# Patient Record
Sex: MALE | Race: WHITE
[De-identification: names, ages, dates, MRNs, and addresses within clinical notes are randomized per-mention and may not be internally consistent; named-entity substitution may affect disease eponyms.]

---

## 2017-02-23 ENCOUNTER — HOSPITAL ENCOUNTER (OUTPATIENT)
Dept: HOSPITAL 75 - ER | Age: 47
Setting detail: OBSERVATION
LOS: 1 days | Discharge: HOME | End: 2017-02-24
Attending: INTERNAL MEDICINE | Admitting: INTERNAL MEDICINE
Payer: COMMERCIAL

## 2017-02-23 VITALS — SYSTOLIC BLOOD PRESSURE: 102 MMHG | DIASTOLIC BLOOD PRESSURE: 74 MMHG

## 2017-02-23 VITALS — SYSTOLIC BLOOD PRESSURE: 100 MMHG | DIASTOLIC BLOOD PRESSURE: 72 MMHG

## 2017-02-23 VITALS — DIASTOLIC BLOOD PRESSURE: 90 MMHG | SYSTOLIC BLOOD PRESSURE: 132 MMHG

## 2017-02-23 VITALS — DIASTOLIC BLOOD PRESSURE: 89 MMHG | SYSTOLIC BLOOD PRESSURE: 120 MMHG

## 2017-02-23 VITALS — HEIGHT: 74 IN | BODY MASS INDEX: 27.98 KG/M2 | WEIGHT: 218.06 LBS

## 2017-02-23 VITALS — DIASTOLIC BLOOD PRESSURE: 74 MMHG | SYSTOLIC BLOOD PRESSURE: 97 MMHG

## 2017-02-23 VITALS — DIASTOLIC BLOOD PRESSURE: 69 MMHG | SYSTOLIC BLOOD PRESSURE: 108 MMHG

## 2017-02-23 VITALS — DIASTOLIC BLOOD PRESSURE: 81 MMHG | SYSTOLIC BLOOD PRESSURE: 114 MMHG

## 2017-02-23 VITALS — DIASTOLIC BLOOD PRESSURE: 88 MMHG | SYSTOLIC BLOOD PRESSURE: 107 MMHG

## 2017-02-23 VITALS — DIASTOLIC BLOOD PRESSURE: 73 MMHG | SYSTOLIC BLOOD PRESSURE: 121 MMHG

## 2017-02-23 VITALS — DIASTOLIC BLOOD PRESSURE: 80 MMHG | SYSTOLIC BLOOD PRESSURE: 120 MMHG

## 2017-02-23 DIAGNOSIS — Z72.89: ICD-10-CM

## 2017-02-23 DIAGNOSIS — Z72.0: ICD-10-CM

## 2017-02-23 DIAGNOSIS — F41.9: ICD-10-CM

## 2017-02-23 DIAGNOSIS — I10: ICD-10-CM

## 2017-02-23 DIAGNOSIS — I48.91: Primary | ICD-10-CM

## 2017-02-23 LAB
ALBUMIN SERPL-MCNC: 4.5 G/DL (ref 3.2–4.5)
ALT SERPL-CCNC: 42 U/L (ref 0–55)
ANION GAP SERPL CALC-SCNC: 12 MMOL/L (ref 5–14)
APTT BLD: 29 SEC (ref 24–35)
AST SERPL-CCNC: 34 U/L (ref 5–34)
BASOPHILS # BLD AUTO: 0 10^3/UL (ref 0–0.1)
BASOPHILS NFR BLD AUTO: 0 % (ref 0–10)
BILIRUB SERPL-MCNC: 0.9 MG/DL (ref 0.1–1)
BUN SERPL-MCNC: 15 MG/DL (ref 7–18)
BUN/CREAT SERPL: 12
CALCIUM SERPL-MCNC: 9.1 MG/DL (ref 8.5–10.1)
CHLORIDE SERPL-SCNC: 104 MMOL/L (ref 98–107)
CO2 SERPL-SCNC: 24 MMOL/L (ref 21–32)
CREAT SERPL-MCNC: 1.26 MG/DL (ref 0.6–1.3)
EOSINOPHIL # BLD AUTO: 0.1 10^3/UL (ref 0–0.3)
EOSINOPHIL NFR BLD AUTO: 1 % (ref 0–10)
ERYTHROCYTE [DISTWIDTH] IN BLOOD BY AUTOMATED COUNT: 12.9 % (ref 10–14.5)
GFR SERPLBLD BASED ON 1.73 SQ M-ARVRAT: > 60 ML/MIN
GLUCOSE SERPL-MCNC: 101 MG/DL (ref 70–105)
INR PPP: 1.1 (ref 0.8–1.4)
LYMPHOCYTES # BLD AUTO: 1.9 X 10^3 (ref 1–4)
LYMPHOCYTES NFR BLD AUTO: 25 % (ref 12–44)
MAGNESIUM SERPL-MCNC: 2.3 MG/DL (ref 1.8–2.4)
MCH RBC QN AUTO: 31 PG (ref 25–34)
MCHC RBC AUTO-ENTMCNC: 34 G/DL (ref 32–36)
MCV RBC AUTO: 90 FL (ref 80–99)
MONOCYTES # BLD AUTO: 0.7 X 10^3 (ref 0–1)
MONOCYTES NFR BLD AUTO: 9 % (ref 0–12)
MYOGLOBIN SERPL-MCNC: 51.1 NG/ML (ref 10–92)
NEUTROPHILS # BLD AUTO: 5 X 10^3 (ref 1.8–7.8)
NEUTROPHILS NFR BLD AUTO: 65 % (ref 42–75)
PLATELET # BLD: 158 10^3/UL (ref 130–400)
PMV BLD AUTO: 11.6 FL (ref 7.4–10.4)
POTASSIUM SERPL-SCNC: 4.5 MMOL/L (ref 3.6–5)
PROT SERPL-MCNC: 7.6 G/DL (ref 6.4–8.2)
PROTHROMBIN TIME: 13.7 SEC (ref 12.2–14.7)
RBC # BLD AUTO: 4.91 10^6/UL (ref 4.35–5.85)
SODIUM SERPL-SCNC: 140 MMOL/L (ref 135–145)
WBC # BLD AUTO: 7.7 10^3/UL (ref 4.3–11)

## 2017-02-23 PROCEDURE — 80061 LIPID PANEL: CPT

## 2017-02-23 PROCEDURE — 36415 COLL VENOUS BLD VENIPUNCTURE: CPT

## 2017-02-23 PROCEDURE — 93041 RHYTHM ECG TRACING: CPT

## 2017-02-23 PROCEDURE — 80053 COMPREHEN METABOLIC PANEL: CPT

## 2017-02-23 PROCEDURE — 84443 ASSAY THYROID STIM HORMONE: CPT

## 2017-02-23 PROCEDURE — 84484 ASSAY OF TROPONIN QUANT: CPT

## 2017-02-23 PROCEDURE — 83735 ASSAY OF MAGNESIUM: CPT

## 2017-02-23 PROCEDURE — 96366 THER/PROPH/DIAG IV INF ADDON: CPT

## 2017-02-23 PROCEDURE — 71010: CPT

## 2017-02-23 PROCEDURE — 93005 ELECTROCARDIOGRAM TRACING: CPT

## 2017-02-23 PROCEDURE — 85730 THROMBOPLASTIN TIME PARTIAL: CPT

## 2017-02-23 PROCEDURE — 99211 OFF/OP EST MAY X REQ PHY/QHP: CPT

## 2017-02-23 PROCEDURE — 85025 COMPLETE CBC W/AUTO DIFF WBC: CPT

## 2017-02-23 PROCEDURE — 85610 PROTHROMBIN TIME: CPT

## 2017-02-23 PROCEDURE — 83874 ASSAY OF MYOGLOBIN: CPT

## 2017-02-23 PROCEDURE — 93306 TTE W/DOPPLER COMPLETE: CPT

## 2017-02-23 PROCEDURE — 96365 THER/PROPH/DIAG IV INF INIT: CPT

## 2017-02-23 RX ADMIN — Medication SCH ML: at 14:00

## 2017-02-23 RX ADMIN — DILTIAZEM HYDROCHLORIDE SCH MLS/HR: 100 INJECTION, POWDER, LYOPHILIZED, FOR SOLUTION INTRAVENOUS at 20:55

## 2017-02-23 RX ADMIN — DILTIAZEM HYDROCHLORIDE SCH MLS/HR: 100 INJECTION, POWDER, LYOPHILIZED, FOR SOLUTION INTRAVENOUS at 14:29

## 2017-02-23 RX ADMIN — APIXABAN SCH MG: 5 TABLET, FILM COATED ORAL at 20:54

## 2017-02-23 RX ADMIN — Medication SCH ML: at 22:00

## 2017-02-23 NOTE — CARDIOLOGY HISTORY & PHYSICAL
HPI-Cardiology


Cardiology H&P


Date of Admission


17


Primary Care  Physician


Yue,Local Physician


Attending Physician


Karin Medina MD FACP [facility]


Consulting Physician








TEE


Mr. Fuentes is a 46 year old male who is being admitted to ICU from the ED.  He 

reports he had been at work all night and has only slept approx 2 hours in the 

last 24 hours.  He states he was getting out of his truck at home and converted 

into a-fib with associated dizziness and shortness of breath.  No c/o CP, 

syncope or near syncope.  He is currently feeling better.  He states he has 

been taking his Torpol XL 100mg daily.





Review of Systems-Cardiology


Review of Systems


Constitutional:   As described under HPI


Eyes:  No blurred vision, No drainage, No pain, No vision change


Ears/Nose/Throat:  No ear discharge, No ear pain, No nasal drainage, No 

ulcerations


Respiratory:   As described under HPI


Cardiovascular:   As described under HPI


Gastrointestinal:  No constipation, No diarrhea, No nausea, No vomiting, No 

stool coloration changes


Genitourinary:  No dysuria, No discharge, No frequency, No hematuria, No urgency


Skin:  No rash, No skin related problems, No ulcerations


Psychiatric/Neurological:  No anxiety, No depression, No focal weakness, No 

seizure, No syncope


Hematologic:  No bleeding abnormalities





PMH-Social-Family Hx


Patient Social History


Alcohol Use:  Occasionally Uses


Recreational Drug Use:  No


Smoking Status:  Former Smoker


Former smoker/When Quit:  May 14, 2008


Recent Foreign Travel:  No


Recent Infectious Disease Expo:  No





Immunizations Up To Date


Tetanus Booster (TDap):  Unknown





Past Medical History


PMH


As described under Assessment.





Family Medical History


Family Medical History:  


He reports a brother with hypertension and palpitations.  His sister has


hypertension.  No family h/o of premature CAD.


Family History:  


19 MOTHER


  Colon cancer


G8 BROTHER


  Hypertension


G8 SISTER


  Hypertension


  Kidney disease





Allergies and Home Medications


Allergies


Coded Allergies:  


     No Known Drug Allergies (Unverified , 14)





Home Medications


Metoprolol Succinate 100 Mg Tab.er.24h 100 MG PO DAILY (Reported) 





Physical Exam-Cardiology


Physical Exam


Vital Signs/I&O





 Vital Sign - Last 12Hours








 17





 08:59 09:12 09:21 12:25


 


Temp 98.1  98.0 


 


Pulse 144  124 89


 


Resp 16  16 16


 


B/P 192/125  148/110 


 


Pulse Ox 100  99 98


 


O2 Delivery Room Air Nasal Cannula  


 


O2 Flow Rate  2 2.00 2


 


    





 17





 12:30 13:00 13:00 14:00


 


Temp 96.8   


 


Pulse 113 67 67 68


 


Resp 18 13  15


 


B/P 107/88 120/89  114/81


 


Pulse Ox 96   


 


O2 Delivery Room Air Room Air  Room Air


 


    





 17





 14:29 15:00 16:00 16:00


 


Temp    97.9


 


Pulse 100 101 67 67


 


Resp  17 13 13


 


B/P 113/81 97/74 102/74 102/74


 


Pulse Ox  98 96 96


 


O2 Delivery  Room Air Room Air Room Air





Capillary Refill : Less Than 3 Seconds


Constitutional:   appears stated ageNo apparent distress,  well-developed well-

nourished


HEENT:   PERRLNo discharge,  hearing is well preserved oral hygience is goodNo 

ulceration, No xanthelasmas are seen


Neck:  No carotid bruit,  carotid pulses are 2 + bilaterally


Cardiovascular:   irregularly irregularNo JVD,  S1 and S2


Gastrointestinal:  No tender,  soft roundNo spleenomegaly


Rectal:   deferred


Extremities:  No clubbing, No cyanosis, No significant edema


Neurologic/Psychiatric:   alert oriented x 3 power is 5/5 both on sides


Skin:  No rash, No ulcerations





Data Review


Labs


 Laboratory Tests


17 09:05: 


Activated Partial Thromboplast Time 29, Alanine Aminotransferase (ALT/SGPT) 42, 

Albumin 4.5, Alkaline Phosphatase 58, Anion Gap 12, Aspartate Amino Transf (AST/

SGOT) 34, BUN/Creatinine Ratio 12, Basophils # (Auto) 0.0, Basophils (%) (Auto) 

0, Blood Urea Nitrogen 15, Calcium Level 9.1, Carbon Dioxide Level 24, Chloride 

Level 104, Creatinine 1.26, Eosinophils # (Auto) 0.1, Eosinophils (%) (Auto) 1, 

Estimat Glomerular Filtration Rate > 60, Glucose Level 101, Hematocrit 44, 

Hemoglobin 15.0, INR Comment 1.1, Lymphocytes # (Auto) 1.9, Lymphocytes (%) (

Auto) 25, Magnesium Level 2.3, Mean Corpuscular Hemoglobin 31, Mean Corpuscular 

Hemoglobin Concent 34, Mean Corpuscular Volume 90, Mean Platelet Volume 11.6H, 

Monocytes # (Auto) 0.7, Monocytes (%) (Auto) 9, Myoglobin 51.1, Neutrophils # (

Auto) 5.0, Neutrophils (%) (Auto) 65, Platelet Count 158, Potassium Level 4.5, 

Prothrombin Time 13.7, Red Blood Count 4.91, Red Cell Distribution Width 12.9, 

Sodium Level 140, TSH [facility] Testing 2.15, Total Bilirubin 0.9, Total Protein 

7.6, Troponin I < 0.30, White Blood Count 7.7








Radiology


NAME:      PACO FUENTES


[facility] REC#:   [id number]


ACCOUNT#:   [id number]


PT STATUS:   REG ER


:      1970


PHYSICIAN:    KYUNG CAI MD


ADMIT DATE:   17/ER


***Signed***


Date of Exam:   17





CHEST 1 VIEW, AP/PA ONLY


 


INDICATION: Atrial fibrillation





PA chest obtained at 9:29 a.m.





Heart and mediastinal silhouette are normal in appearance. The


lungs are clear. There is no pneumothorax or pleural fluid.





IMPRESSION:





Negative chest and no change from 16.





Dictated by:





Dictated on workstation # [id number]





Dict:   [id number]


Trans:   17 [id number]


[facility]  [id number]





Interpreted by:       KYLEIGH PETER MD


Electronically signed by:KYLEIGH PETER MD   17 [id number]





ECG Impression


ECG


Initial ECG Impression:  Atrial Fibrillation w/RVR





A/P-Cardiology


Assessment/Admission Diagnosis


Atrial fib with RVR





Hypertension





PAF - electrical cardioversion on 13, 2014, and 6-4-15. RFA by Dr Delatorre in 2015. Oral anticoag stopped by Dr Delatorre in Dec 2016





EFRAÍN 14 and of 6-4-15 showed LVEF 60%.  No significant valvular 

regurgitation or stenoses see. No evidence of intracardiac thrombus or 

intracardiac shunt





CT Abd/Pelvis 14 showed calcification in the left renal pelvis near the 

ureteropelvic junction without significant obstruction - following with his PCP





Intermittent heavy alcohol use - cessation advised





Chronic tobacco use (chews) - cessation advised





H/o anxiety





Discussion and Recomendations


H/O PAF for which he has seen Dr. Delatorre in the past and has undergone several 

cardioversions and most recently ablation in 2015.  He is currently in a-fib 

with RVR.  He is being maintained on a Cardizem gtt.  He was previously not on 

OAC.  We will restart him on OAC with Eliquis for stroke prophylaxis.  We have 

discussed this with him.  We will do an echocardiogram to evaluate LVEF and 

valvular status.  Further recommendations will be based on his hospital course.





This H&P is being scribed by BRAULIO Singh on behalf of Dr. Medina after 

discussion regarding plan of care





Physician Assessment


Physician Assessment


Lungs: good bilat air entry


Cor: irreg





A&R


* As documented in our note above


* I spoke with him and answered questions


* Will be in ICU while on iv diltiazem








GUILLERMO SYED 2017 11:18


KARIN MEDINA MD FACP [facility]AC CCDS 2017 17:12

## 2017-02-23 NOTE — ED CARDIAC GENERAL
History of Present Illness


General


Chief Complaint:  Cardiac/General Problems


Stated Complaint:  IRR HEART RATE


Nursing Triage Note:  


AMBUALTED TO ROOM 05 WITH COMPLAINTS OF BEING IN AFIB FOR ABOUT 20 MINS PTA.


Source:  patient


Exam Limitations:  no limitations





History of Present Illness


Time seen by provider:  09:00


Initial Comments


This 46 year old gentleman presents to the emergency room with complaints of 

irregular heart rhythm.  He believes he is in atrial fibrillation.  He has a 

history of atrial fibrillation and had an ablation procedure performed about a 

year and a half ago at [facility].  He was taken off of anticoagulation shortly after 

that ablation.  He has had no episodes of A. fib to his knowledge in the 

interim.  Atrial fibrillation is confirmed on the monitor and EKG.





Allergies and Home Medications


Allergies


Coded Allergies:  


     No Known Drug Allergies (Unverified , 14)





Home Medications


Metoprolol Succinate 100 Mg Tab.er.24h #30 (Reported) 





Review of Systems


Constitutional:   no symptoms reported


EENTM:   No Symptoms Reported


Respiratory:   No Symptoms Reported


Cardiovascular:   See HPI


Gastrointestinal:   No Symptoms Reported


Genitourinary:   No Symptoms Reported


Musculoskeletal:   no symptoms reported


Skin:   no symptoms reported


Endocrine:   No Symptoms Reported





Past Medical-Social-Family Hx


Patient Social History


Alcohol Use:  Occasionally Uses


Recreational Drug Use:  No


Smoking Status:  Former Smoker


Former Smoker/When Quit:  May 14, 2008


Recent Foreign Travel:  No


Contact w/Someone Who Travel:  No


Recent Infectious Disease Expo:  No


Recent Hopitalizations:  No





Immunizations Up To Date


Tetanus Booster (TDap):  Unknown





Surgeries


HX Surgeries:  Yes (ATRIAL FIBRILLATION--S/P CARDIAC ABLATION)


Surgeries:  Appendectomy, Cardiac (ablation for treatment of atrial fibrillation

)





Respiratory


Hx Respiratory Disorders:  No





Cardiovascular


Hx Cardiac Disorders:  Yes (S/P ABLATION)


Cardiac Disorders:  Atrial Fibrillation, Hypertension





Neurological


Hx Neurological Disorders:  Yes


Neurological Disorders:  Concussion





Reproductive System


Hx Reproductive Disorders:  No





Genitourinary


Hx Genitourinary Disorders:  No





Gastrointestinal


Hx Gastrointestinal Disorders:  No





Musculoskeletal


Hx Musculoskeletal Disorders:  No





Endocrine


Hx Endocrine Disorders:  No





HEENT


HX ENT Disorders:  No





Cancer


Hx Cancer:  No





Psychosocial


Hx Psychiatric Problems:  No





Integumentary


HX Skin/Integumentary Disorder:  No





Blood Transfusions


Hx Blood Disorders:  No


Adverse Reaction to a Blood Tr:  No





Family Medical History


Family Medial History:  


Colon cancer


  19 MOTHER


Hypertension


  G8 BROTHER


  G8 SISTER


Kidney disease


  G8 SISTER





Physical Exam


Vital Signs





 Vital Sign - Last 12Hours








 17





 08:59 09:12


 


Temp 98.1 


 


Pulse 144 


 


Resp 16 


 


B/P 192/125 


 


Pulse Ox 100 


 


O2 Delivery Room Air 


 


O2 Flow Rate  2





Capillary Refill : Less Than 3 Seconds


General Appearance:   WD/WN Mild Distress


HEENT:   PERRL/EOMI Normal ENT Inspection


Neck:   Normal Inspection


Respiratory:   Lungs Clear Normal Breath Sounds No Accessory Muscle Use No 

Respiratory Distress


Cardiovascular:   No Edema No Murmur Irregularly Irregular Tachycardia


Gastrointestinal:   Non Tender Soft


Extremity:   Normal Inspection No Pedal Edema


Neurologic/Psychiatric:   Alert Oriented x3 No Motor/Sensory Deficits Normal 

Mood/Affect CNs II-XII Norm as Tested


Skin:   Normal Color Warm/Dry





Progress/Results/Core Measures


Results/Orders


Lab Results





 Laboratory Tests








Test


  17


09:05 Range/Units


 


 


Activated Partial


Thromboplast Time 29 


  24-35  SEC


 


 


Alanine Aminotransferase


(ALT/SGPT) 42 


  0-55  U/L


 


 


Albumin 4.5  3.2-4.5  G/DL


 


Alkaline Phosphatase 58    U/L


 


Anion Gap 12  5-14  MMOL/L


 


Aspartate Amino Transf


(AST/SGOT) 34 


  5-34  U/L


 


 


BUN/Creatinine Ratio 12   


 


Basophils # (Auto)


  0.0 


  0.0-0.1


10^3/uL


 


Basophils (%) (Auto) 0  0-10  %


 


Blood Urea Nitrogen 15  7-18  MG/DL


 


Calcium Level 9.1  8.5-10.1  MG/DL


 


Carbon Dioxide Level 24  21-32  MMOL/L


 


Chloride Level 104    MMOL/L


 


Creatinine


  1.26 


  0.60-1.30


MG/DL


 


Eosinophils # (Auto)


  0.1 


  0.0-0.3


10^3/uL


 


Eosinophils (%) (Auto) 1  0-10  %


 


Estimat Glomerular Filtration


Rate > 60 


   


 


 


Glucose Level 101    MG/DL


 


Hematocrit 44  40-54  %


 


Hemoglobin 15.0  13.3-17.7  G/DL


 


INR Comment 1.1  0.8-1.4  


 


Lymphocytes # (Auto) 1.9  1.0-4.0  X 10^3


 


Lymphocytes (%) (Auto) 25  12-44  %


 


Magnesium Level 2.3  1.8-2.4  MG/DL


 


Mean Corpuscular Hemoglobin 31  25-34  PG


 


Mean Corpuscular Hemoglobin


Concent 34 


  32-36  G/DL


 


 


Mean Corpuscular Volume 90  80-99  FL


 


Mean Platelet Volume 11.6 H 7.4-10.4  FL


 


Monocytes # (Auto) 0.7  0.0-1.0  X 10^3


 


Monocytes (%) (Auto) 9  0-12  %


 


Myoglobin


  51.1 


  10.0-92.0


NG/ML


 


Neutrophils # (Auto) 5.0  1.8-7.8  X 10^3


 


Neutrophils (%) (Auto) 65  42-75  %


 


Platelet Count


  158 


  130-400


10^3/uL


 


Potassium Level 4.5  3.6-5.0  MMOL/L


 


Prothrombin Time 13.7  12.2-14.7  SEC


 


Red Blood Count


  4.91 


  4.35-5.85


10^6/uL


 


Red Cell Distribution Width 12.9  10.0-14.5  %


 


Sodium Level 140  135-145  MMOL/L


 


TSH [facility] Testing


  2.15 


  0.35-4.94


UIU/ML


 


Total Bilirubin 0.9  0.1-1.0  MG/DL


 


Total Protein 7.6  6.4-8.2  G/DL


 


Troponin I < 0.30  <0.30  NG/ML


 


White Blood Count


  7.7 


  4.3-11.0


10^3/uL








My Orders





 Orders-KYUNG CAI MD


Cbc With Automated Diff (17 09:06)


Magnesium (17 09:06)


Chest 1 View, Ap/Pa Only (17 09:06)


Ekg Tracing (17 09:06)


Cardiac Profile 1 (17 09:06)


Comprehensive Metabolic Panel (17 09:06)


Myoglobin Serum (17 09:06)


Protime With Inr (17 09:06)


Partial Thromboplastin Time (17 09:06)


O2 (17 09:06)


Monitor-Rhythm Ecg Trace Only (17 09:06)


Lipid Panel (17 06:00)


Aspirin Chewable Tablet (Baby Aspirin Ch (17 09:15)


Saline Lock/Iv-Start (17 09:06)


Thyroid Analyzer (17 09:06)


Diltiazem Injection (Cardizem Injection) (17 09:15)


Sodium Chloride (Ad... W/Diltiazem Drip (17 09:15)


Apixaban Tablet (Eliquis Tablet) (17 11:30)





Medications Given in ED





 Current Medications








 Medications  Dose


 Ordered  Sig/Soco


 Route  Start Time


 Stop Time Status Last Admin


Dose Admin


 


 Aspirin  324 mg  ONCE  ONCE


 PO  17 09:15


 17 09:16 DC 17 09:18


324 MG


 


 Diltiazem HCl  10 mg  ONCE  ONCE


 IVP  17 09:15


 17 09:16 DC 17 09:18


10 MG








Vital Signs/I&O





 Vital Sign - Last 12Hours








 17





 08:59 09:12 09:21


 


Temp 98.1  98.0


 


Pulse 144  124


 


Resp 16  16


 


B/P 192/125  148/110


 


Pulse Ox 100  99


 


O2 Delivery Room Air Nasal Cannula 


 


O2 Flow Rate  2 2.00








Blood Pressure Mean:  147


Progress Note :  


Progress Note


Patient was started on a Cardizem drip and the drip was titrated up until 

satisfactory response.  Heart rate eventually decreased to about 100.  Case was 

reviewed with Dr. Medina who advised admission on continued Cardizem drip.  He 

requested Eliquis 5 mg be administered prior to transfer upstairs.





Diagnostic Imaging





   Diagonstic Imaging:  Xray


   Plain Films/CT/US/NM/MRI:  chest


Comments


NAME:      PACO FUENTES


[facility] REC#:   [id number]


ACCOUNT#:   [id number]


PT STATUS:   REG ER


:      1970


PHYSICIAN:    KYUNG CAI MD


ADMIT DATE:   17/ER


***Signed***


Date of Exam:   17





CHEST 1 VIEW, AP/PA ONLY


 


INDICATION: Atrial fibrillation





PA chest obtained at 9:29 a.m.





Heart and mediastinal silhouette are normal in appearance. The


lungs are clear. There is no pneumothorax or pleural fluid.





IMPRESSION:





Negative chest and no change from 16.





Dictated by:





Dictated on workstation # [id number]





Dict:   [id number]


Trans:   17 [id number]


[facility]  [id number]





Interpreted by:       KYLEIGH PETER MD


Electronically signed by:KYLEIGH PETER MD   17 [id number]





Departure


Communication


Time/Spoke to Admitting Phy:  11:10


Communication


Dr. Medina





Impression


Impression:  


 Primary Impression:  


 Atrial fibrillation with RVR


Disposition:   ADMITTED AS INPATIENT


Condition:  Improved


Decision to Admit Reason:  Admit from ER (General)


Decision to Admit/Date:  2017


Time/Decision to Admit Time:  11:00





Departure-Patient Inst.


Referrals:  


NO,LOCAL PHYSICIAN (PCP/Family)


Primary Care Physician








KYUNG CAI MD 2017 11:32

## 2017-02-23 NOTE — DIAGNOSTIC IMAGING REPORT
INDICATION: Atrial fibrillation



PA chest obtained at 9:29 a.m.



Heart and mediastinal silhouette are normal in appearance. The

lungs are clear. There is no pneumothorax or pleural fluid.



IMPRESSION:



Negative chest and no change from 11/27/16.



Dictated by:



Dictated on workstation # [id number]

## 2017-02-24 VITALS — SYSTOLIC BLOOD PRESSURE: 136 MMHG | DIASTOLIC BLOOD PRESSURE: 104 MMHG

## 2017-02-24 VITALS — DIASTOLIC BLOOD PRESSURE: 61 MMHG | SYSTOLIC BLOOD PRESSURE: 115 MMHG

## 2017-02-24 VITALS — SYSTOLIC BLOOD PRESSURE: 100 MMHG | DIASTOLIC BLOOD PRESSURE: 64 MMHG

## 2017-02-24 VITALS — SYSTOLIC BLOOD PRESSURE: 105 MMHG | DIASTOLIC BLOOD PRESSURE: 94 MMHG

## 2017-02-24 VITALS — DIASTOLIC BLOOD PRESSURE: 80 MMHG | SYSTOLIC BLOOD PRESSURE: 103 MMHG

## 2017-02-24 VITALS — DIASTOLIC BLOOD PRESSURE: 80 MMHG | SYSTOLIC BLOOD PRESSURE: 107 MMHG

## 2017-02-24 VITALS — DIASTOLIC BLOOD PRESSURE: 70 MMHG | SYSTOLIC BLOOD PRESSURE: 114 MMHG

## 2017-02-24 VITALS — DIASTOLIC BLOOD PRESSURE: 66 MMHG | SYSTOLIC BLOOD PRESSURE: 94 MMHG

## 2017-02-24 VITALS — SYSTOLIC BLOOD PRESSURE: 110 MMHG | DIASTOLIC BLOOD PRESSURE: 78 MMHG

## 2017-02-24 VITALS — SYSTOLIC BLOOD PRESSURE: 136 MMHG | DIASTOLIC BLOOD PRESSURE: 98 MMHG

## 2017-02-24 VITALS — SYSTOLIC BLOOD PRESSURE: 96 MMHG | DIASTOLIC BLOOD PRESSURE: 64 MMHG

## 2017-02-24 VITALS — DIASTOLIC BLOOD PRESSURE: 76 MMHG | SYSTOLIC BLOOD PRESSURE: 104 MMHG

## 2017-02-24 LAB
ALBUMIN SERPL-MCNC: 3.8 G/DL (ref 3.2–4.5)
ALT SERPL-CCNC: 34 U/L (ref 0–55)
ANION GAP SERPL CALC-SCNC: 12 MMOL/L (ref 5–14)
AST SERPL-CCNC: 27 U/L (ref 5–34)
BASOPHILS # BLD AUTO: 0 10^3/UL (ref 0–0.1)
BASOPHILS NFR BLD AUTO: 1 % (ref 0–10)
BILIRUB SERPL-MCNC: 0.8 MG/DL (ref 0.1–1)
BUN SERPL-MCNC: 21 MG/DL (ref 7–18)
BUN/CREAT SERPL: 18
CALCIUM SERPL-MCNC: 8.9 MG/DL (ref 8.5–10.1)
CHLORIDE SERPL-SCNC: 106 MMOL/L (ref 98–107)
CHOLEST SERPL-MCNC: 187 MG/DL (ref ?–200)
CO2 SERPL-SCNC: 22 MMOL/L (ref 21–32)
CREAT SERPL-MCNC: 1.17 MG/DL (ref 0.6–1.3)
EOSINOPHIL # BLD AUTO: 0.1 10^3/UL (ref 0–0.3)
EOSINOPHIL NFR BLD AUTO: 2 % (ref 0–10)
ERYTHROCYTE [DISTWIDTH] IN BLOOD BY AUTOMATED COUNT: 12.9 % (ref 10–14.5)
GFR SERPLBLD BASED ON 1.73 SQ M-ARVRAT: > 60 ML/MIN
GLUCOSE SERPL-MCNC: 106 MG/DL (ref 70–105)
LDLC SERPL DIRECT ASSAY-MCNC: 122 MG/DL (ref 1–129)
LYMPHOCYTES # BLD AUTO: 1.9 X 10^3 (ref 1–4)
LYMPHOCYTES NFR BLD AUTO: 29 % (ref 12–44)
MAGNESIUM SERPL-MCNC: 2.2 MG/DL (ref 1.8–2.4)
MCH RBC QN AUTO: 31 PG (ref 25–34)
MCHC RBC AUTO-ENTMCNC: 34 G/DL (ref 32–36)
MCV RBC AUTO: 91 FL (ref 80–99)
MONOCYTES # BLD AUTO: 0.7 X 10^3 (ref 0–1)
MONOCYTES NFR BLD AUTO: 11 % (ref 0–12)
NEUTROPHILS # BLD AUTO: 3.7 X 10^3 (ref 1.8–7.8)
NEUTROPHILS NFR BLD AUTO: 58 % (ref 42–75)
PLATELET # BLD: 151 10^3/UL (ref 130–400)
PMV BLD AUTO: 12.4 FL (ref 7.4–10.4)
POTASSIUM SERPL-SCNC: 4.7 MMOL/L (ref 3.6–5)
PROT SERPL-MCNC: 6.8 G/DL (ref 6.4–8.2)
RBC # BLD AUTO: 4.73 10^6/UL (ref 4.35–5.85)
SODIUM SERPL-SCNC: 140 MMOL/L (ref 135–145)
TRIGL SERPL-MCNC: 201 MG/DL (ref ?–150)
VLDLC SERPL CALC-MCNC: 40 MG/DL (ref 5–40)
WBC # BLD AUTO: 6.4 10^3/UL (ref 4.3–11)

## 2017-02-24 RX ADMIN — DILTIAZEM HYDROCHLORIDE SCH MLS/HR: 100 INJECTION, POWDER, LYOPHILIZED, FOR SOLUTION INTRAVENOUS at 06:57

## 2017-02-24 RX ADMIN — Medication SCH ML: at 05:53

## 2017-02-24 RX ADMIN — APIXABAN SCH MG: 5 TABLET, FILM COATED ORAL at 08:19

## 2017-02-24 NOTE — CARDIOLOGY DISCHARGE SUMMARY
Diagnosis/Chief Complaint


Date of Admission


2017 at 11:10


Date of Discharge





Final/Discharge Diagnosis


Atrial fib with RVR, now rate better controlled





Apixaban anticoag reinitiated during this hosp





Hypertension





PAF - electrical cardioversion on 13, 2014, and 6-4-15. RFA by Dr Delatorre in 2015. Oral anticoag stopped by Dr Delatorre in Dec 2016





EFRAÍN 14 and of 6-4-15 showed LVEF 60%.  No significant valvular 

regurgitation or stenoses see. No evidence of intracardiac thrombus or 

intracardiac shunt





CT Abd/Pelvis 14 showed calcification in the left renal pelvis near the 

ureteropelvic junction without significant obstruction - following with his PCP





Intermittent heavy alcohol use - cessation advised





Chronic tobacco use (chews) - cessation advised





H/o anxiety





Chief Complaint/HPI


Chief Complaint/HPI


Mr. Simmons is a 46 year old male who is being admitted to ICU from the ED.  He 

reports he had been at work all night and has only slept approx 2 hours in the 

last 24 hours.  He states he was getting out of his truck at home and converted 

into a-fib with associated dizziness and shortness of breath.  No c/o CP, 

syncope or near syncope.  He is currently feeling better.  He states he has 

been taking his Torpol XL 100mg daily.





See note of 17





Discharge Summary


Procedures


None.


Hospital Course


Pending Labs


Laboratory Tests


17 04:00: 


Alanine Aminotransferase (ALT/SGPT) 34, Albumin 3.8, Alkaline Phosphatase 51, 

Anion Gap 12, Aspartate Amino Transf (AST/SGOT) 27, BUN/Creatinine Ratio 18, 

Basophils # (Auto) 0.0, Basophils (%) (Auto) 1, Blood Urea Nitrogen 21, Calcium 

Level 8.9, Carbon Dioxide Level 22, Chloride Level 106, Cholesterol Level 187, 

Creatinine 1.17, Eosinophils # (Auto) 0.1, Eosinophils (%) (Auto) 2, Estimat 

Glomerular Filtration Rate > 60, Glucose Level 106, HDL Cholesterol 34, 

Hematocrit 43, Hemoglobin 14.6, LDL Cholesterol Direct 122, Lymphocytes # (Auto

) 1.9, Lymphocytes (%) (Auto) 29, Magnesium Level 2.2, Mean Corpuscular 

Hemoglobin 31, Mean Corpuscular Hemoglobin Concent 34, Mean Corpuscular Volume 

91, Mean Platelet Volume 12.4, Monocytes # (Auto) 0.7, Monocytes (%) (Auto) 11, 

Neutrophils # (Auto) 3.7, Neutrophils (%) (Auto) 58, Platelet Count 151, 

Potassium Level 4.7, Red Blood Count 4.73, Red Cell Distribution Width 12.9, 

Sodium Level 140, Total Bilirubin 0.8, Total Protein 6.8, Triglycerides Level 

201, VLDL Cholesterol 40, White Blood Count 6.4








Discussion & Recommendations


Home Medications


Reviewed patient Home Medication Reconciliation Form





Discharge


Home Medications:


Reviewed and agree with Discharge Medication list on patient's Discharge 

Instruction sheet





Clinical Quality Measures


DVT/VTE Risk/Contraindication:


Risk Factor Score Per Nursin


RFS Level Per Nursing on Admit:  1=Low/No VTE PPX








VALERIE SIDDIQUI MD FACP [facility] CCDS 2017 11:52

## 2017-02-24 NOTE — DISCHARGE INST-CARDIOLOGY
Discharge Inst-Cardiac


Discharge Medications


New Medications:  


Diltiazem HCl (Cardizem Cd) 240 Mg Cap.er.24h


240 MG PO DAILY #30 Ref 3 CAP


 


Continued Medications:  


Metoprolol Succinate (Metoprolol Succinate) 100 Mg Tab.er.24h


100 MG PO DAILY TAB








Patient Instructions


Patient Instructions:  


F/u with Dr Medina on 2/27/17








VALERIE MEDINA MD FACP FAC CCDS Feb 24, 2017 11:51

## 2017-02-24 NOTE — PROGRESS NOTE-CARDIOLOGY
Cardiology SOAP Progress Note


Subjective:


Feels better. No cp or syncope or shortness of breath. Palp much improved. 

Wishes to go home





Objective:


I&O/Vital Signs





 Vital Sign - Last 12Hours








 2/24/17 2/24/17 2/24/17 2/24/17





 00:00 01:00 01:00 02:00


 


Temp 98.0   


 


Pulse 80 86 86 62


 


Resp 18 16  14


 


B/P 94/66 105/94  100/64


 


Pulse Ox 96 95  93


 


O2 Delivery Room Air Room Air  Room Air


 


    





 2/24/17 2/24/17 2/24/17 2/24/17





 03:00 04:00 05:00 06:00


 


Temp  96.7  


 


Pulse 70 71 92 74


 


B/P 115/61 110/78 107/80 96/64


 


Pulse Ox 94   


 


O2 Delivery Room Air Room Air Room Air Room Air


 


    





 2/24/17 2/24/17 2/24/17 2/24/17





 06:57 07:00 08:19 08:19


 


Temp 96.7   98.1


 


Pulse 74 66  72


 


Resp 14   16


 


B/P 96/64   103/80


 


Pulse Ox 94  97 97


 


O2 Delivery    Room Air


 


O2 Flow Rate 2.00   


 


    





 2/24/17   





 11:39   


 


Temp 97.8   














 Intake and Output 


 


 2/24/17





 00:00


 


Intake Total 790 ml


 


Output Total 550 ml


 


Balance 240 ml








Weight (Pounds):  218


Weight (Ounces):  1.0


Weight (Calculated Kilograms):  98.849827


Constitutional:   appears stated ageNo apparent distress,  well-developed well-

nourished


Cardiovascular:   irregularly irregularNo JVD,  S1 and S2


Gastrointestional:  No tender,  soft roundNo spleenomegaly


Extremities:  No clubbing, No cyanosis, No significant edema


Neurologic/Psychiatric:   alert oriented x 3 power is 5/5 both on sides


Skin:  No rash, No ulcerations





Results/Procedures:


Labs


 Laboratory Tests


2/24/17 04:00: 


Alanine Aminotransferase (ALT/SGPT) 34, Albumin 3.8, Alkaline Phosphatase 51, 

Anion Gap 12, Aspartate Amino Transf (AST/SGOT) 27, BUN/Creatinine Ratio 18, 

Basophils # (Auto) 0.0, Basophils (%) (Auto) 1, Blood Urea Nitrogen 21H, 

Calcium Level 8.9, Carbon Dioxide Level 22, Chloride Level 106, Cholesterol 

Level 187, Creatinine 1.17, Eosinophils # (Auto) 0.1, Eosinophils (%) (Auto) 2, 

Estimat Glomerular Filtration Rate > 60, Glucose Level 106H, HDL Cholesterol 34L

, Hematocrit 43, Hemoglobin 14.6, LDL Cholesterol Direct 122, Lymphocytes # (

Auto) 1.9, Lymphocytes (%) (Auto) 29, Magnesium Level 2.2, Mean Corpuscular 

Hemoglobin 31, Mean Corpuscular Hemoglobin Concent 34, Mean Corpuscular Volume 

91, Mean Platelet Volume 12.4H, Monocytes # (Auto) 0.7, Monocytes (%) (Auto) 11

, Neutrophils # (Auto) 3.7, Neutrophils (%) (Auto) 58, Platelet Count 151, 

Potassium Level 4.7, Red Blood Count 4.73, Red Cell Distribution Width 12.9, 

Sodium Level 140, Total Bilirubin 0.8, Total Protein 6.8, Triglycerides Level 

201H, VLDL Cholesterol 40, White Blood Count 6.4





Laboratory Tests


2/23/17 09:05








2/24/17 04:00











A/P:


Assessment:


Atrial fib with RVR, now rate better controlled





Apixaban anticoag reinitiated during this hosp





Hypertension





PAF - electrical cardioversion on 8-6-13, 12-9-2014, and 6-4-15. RFA by Dr Delatorre in Sept 2015. Oral anticoag stopped by Dr Delatorre in Dec 2016





EFRAÍN 12-9-14 and of 6-4-15 showed LVEF 60%.  No significant valvular 

regurgitation or stenoses see. No evidence of intracardiac thrombus or 

intracardiac shunt





CT Abd/Pelvis 12-14-14 showed calcification in the left renal pelvis near the 

ureteropelvic junction without significant obstruction - following with his PCP





Intermittent heavy alcohol use - cessation advised





Chronic tobacco use (chews) - cessation advised





H/o anxiety


Plan:








* Long-acting dilt added


* Apixaban added


* Outpatient f/u on 2/27/17. If remains in a fib, will consider elec CV


* Advised to avoid alcohol use completely


* Return to ER in case of recurrent symptoms or new symptoms








VALERIE SIDDIQUI MD FACP FAC CCDS Feb 24, 2017 11:47

## 2017-03-01 NOTE — ECHOCARDIOGRAPHY REPORT
PROCEDURE PHYSICIAN:   VALERIE MEDINA

 

DATE OF PROCEDURE:  02/23/2017

 

TWO DIMENSIONAL ECHOCARDIOGRAM REPORT 

 

PRIMARY PHYSICIAN:       

OTHER PHYSICIAN:         Dr. Medina 

REFERRING PHYSICIAN:          

ORDERING PHYSICIAN:      BRAULIO Palmer 

 

INDICATION FOR THE PROCEDURE:  Atrial fibrillation.  

 

MEASUREMENTS                  DERIVED VALUES 

 

LV DIAMETER (LAX)   NORMALS                       NORMALS

Diastolic      3.7  (3.6-5.2)      Eject. Fract.  (60%+/-6%)    

  

Systolic            (2.3-3.9)      Diastolic Vol.      

% Shortening        (0.22-0.42)    Systolic Vol.       

                                   Aortic Root    3.3  

IVS THICKNESS 

Diastolic      1.4  (0.6-1.1)

 

LVPW THICKNESS 

Diastolic      1.4  (0.6-1.1)

 

LA DIAMETER 

Systolic       3.9  (2.1-3.7)  

 

 

DESCRIPTION:

Two-dimensional echocardiography shows normal global left

ventricular systolic function with normal regional wall motion.

Aortic, mitral and tricuspid valve leaflets show good leaflet

excursion.  Doppler imaging shows trivial tricuspid

regurgitation. There is no Doppler evidence of any significant

valvular stenosis. Pulmonary artery systolic pressure is

estimated to be approximately 30 mmHg.  The aortic valve appears

to be trileaflet. Left ventricular ejection fraction is

approximately 75%. There is no evidence of any significant

intracardiac shunt on this transthoracic echocardiographic study.

 Inferior vena cava is not dilated and appears mostly collapsed

during the study. 

 

CONCLUSION:

1.   Normal to hyperdynamic left ventricular systolic function

with an ejection fraction of 75%. 

2.   Trivial tricuspid regurgitation. 

3.   Pulmonary artery systolic pressure is estimated to be

approximately 30 mmHg.  

4.   No evidence of any significant valvular stenosis.   

 

 

 

 

Job ID: [id number]

Dictated Date: 02/28/2017 13:55:05 

Transcription Date: 03/01/2017 07:49:22 / pola

## 2018-07-05 ENCOUNTER — HOSPITAL ENCOUNTER (EMERGENCY)
Dept: HOSPITAL 75 - ER | Age: 48
Discharge: HOME | End: 2018-07-05
Payer: COMMERCIAL

## 2018-07-05 VITALS — HEIGHT: 74 IN | WEIGHT: 225 LBS | BODY MASS INDEX: 28.88 KG/M2

## 2018-07-05 VITALS — SYSTOLIC BLOOD PRESSURE: 144 MMHG | DIASTOLIC BLOOD PRESSURE: 94 MMHG

## 2018-07-05 DIAGNOSIS — F12.90: ICD-10-CM

## 2018-07-05 DIAGNOSIS — Z98.890: ICD-10-CM

## 2018-07-05 DIAGNOSIS — Z87.891: ICD-10-CM

## 2018-07-05 DIAGNOSIS — I10: ICD-10-CM

## 2018-07-05 DIAGNOSIS — R00.0: Primary | ICD-10-CM

## 2018-07-05 DIAGNOSIS — Z87.828: ICD-10-CM

## 2018-07-05 DIAGNOSIS — I48.91: ICD-10-CM

## 2018-07-05 DIAGNOSIS — Z90.49: ICD-10-CM

## 2018-07-05 LAB
ALBUMIN SERPL-MCNC: 4.4 GM/DL (ref 3.2–4.5)
ALP SERPL-CCNC: 52 U/L (ref 40–136)
ALT SERPL-CCNC: 33 U/L (ref 0–55)
APTT BLD: 31 SEC (ref 24–35)
BASOPHILS # BLD AUTO: 0 10^3/UL (ref 0–0.1)
BASOPHILS NFR BLD AUTO: 0 % (ref 0–10)
BILIRUB SERPL-MCNC: 0.5 MG/DL (ref 0.1–1)
BUN/CREAT SERPL: 12
CALCIUM SERPL-MCNC: 9.5 MG/DL (ref 8.5–10.1)
CHLORIDE SERPL-SCNC: 107 MMOL/L (ref 98–107)
CO2 SERPL-SCNC: 29 MMOL/L (ref 21–32)
CREAT SERPL-MCNC: 1.12 MG/DL (ref 0.6–1.3)
EOSINOPHIL # BLD AUTO: 0.1 10^3/UL (ref 0–0.3)
EOSINOPHIL NFR BLD AUTO: 2 % (ref 0–10)
ERYTHROCYTE [DISTWIDTH] IN BLOOD BY AUTOMATED COUNT: 12.5 % (ref 10–14.5)
GFR SERPLBLD BASED ON 1.73 SQ M-ARVRAT: > 60 ML/MIN
GLUCOSE SERPL-MCNC: 138 MG/DL (ref 70–105)
HCT VFR BLD CALC: 43 % (ref 40–54)
HGB BLD-MCNC: 14.7 G/DL (ref 13.3–17.7)
INR PPP: 1.1 (ref 0.8–1.4)
LYMPHOCYTES # BLD AUTO: 1.4 X 10^3 (ref 1–4)
LYMPHOCYTES NFR BLD AUTO: 24 % (ref 12–44)
MAGNESIUM SERPL-MCNC: 2.1 MG/DL (ref 1.8–2.4)
MANUAL DIFFERENTIAL PERFORMED BLD QL: NO
MCH RBC QN AUTO: 30 PG (ref 25–34)
MCHC RBC AUTO-ENTMCNC: 34 G/DL (ref 32–36)
MCV RBC AUTO: 87 FL (ref 80–99)
MONOCYTES # BLD AUTO: 0.3 X 10^3 (ref 0–1)
MONOCYTES NFR BLD AUTO: 5 % (ref 0–12)
MYOGLOBIN SERPL-MCNC: 43.9 NG/ML (ref 10–92)
NEUTROPHILS # BLD AUTO: 4.1 X 10^3 (ref 1.8–7.8)
NEUTROPHILS NFR BLD AUTO: 69 % (ref 42–75)
PLATELET # BLD: 155 10^3/UL (ref 130–400)
PMV BLD AUTO: 11.7 FL (ref 7.4–10.4)
POTASSIUM SERPL-SCNC: 3.8 MMOL/L (ref 3.6–5)
PROT SERPL-MCNC: 7.3 GM/DL (ref 6.4–8.2)
PROTHROMBIN TIME: 14.6 SEC (ref 12.2–14.7)
RBC # BLD AUTO: 4.93 10^6/UL (ref 4.35–5.85)
SODIUM SERPL-SCNC: 142 MMOL/L (ref 135–145)
WBC # BLD AUTO: 6 10^3/UL (ref 4.3–11)

## 2018-07-05 PROCEDURE — 84484 ASSAY OF TROPONIN QUANT: CPT

## 2018-07-05 PROCEDURE — 93005 ELECTROCARDIOGRAM TRACING: CPT

## 2018-07-05 PROCEDURE — 93041 RHYTHM ECG TRACING: CPT

## 2018-07-05 PROCEDURE — 36415 COLL VENOUS BLD VENIPUNCTURE: CPT

## 2018-07-05 PROCEDURE — 80053 COMPREHEN METABOLIC PANEL: CPT

## 2018-07-05 PROCEDURE — 83735 ASSAY OF MAGNESIUM: CPT

## 2018-07-05 PROCEDURE — 71045 X-RAY EXAM CHEST 1 VIEW: CPT

## 2018-07-05 PROCEDURE — 85610 PROTHROMBIN TIME: CPT

## 2018-07-05 PROCEDURE — 85730 THROMBOPLASTIN TIME PARTIAL: CPT

## 2018-07-05 PROCEDURE — 96360 HYDRATION IV INFUSION INIT: CPT

## 2018-07-05 PROCEDURE — 83874 ASSAY OF MYOGLOBIN: CPT

## 2018-07-05 PROCEDURE — 85025 COMPLETE CBC W/AUTO DIFF WBC: CPT

## 2018-07-05 NOTE — ED CARDIAC GENERAL
History of Present Illness


General


Chief Complaint:  Cardiac/General Problems


Stated Complaint:  HEART RATE RISING/FALLING, DIZZY, AFIB HISTORY


Nursing Triage Note:  


PT STATES HX OF A-FIB, STARTED FEELING DIZZY ABOUT 30 MIN PTA.


Source:  patient


Exam Limitations:  no limitations





History of Present Illness


Date Seen by Provider:  Jul 5, 2018


Time Seen by Provider:  17:09


Initial Comments


to ER with reports of tachycardia.He has a history of atrial fibrillation. He 

was formerly on Eliquis and is still on metoprolol. He was sent by Dr. Medina 

to a cardiologist in [place] and had an ablation done in 2015. He's not had 

any recurrent episodes of this feeling since then and has been taken off of his 

Eliquis, is only on a baby aspirin.  Symptoms lasted for about 15 minutes 

before resolving spontaneously. He states he heart rate did not feel irregular, 

only fast. He counted his heart rate of 108. No chest pain, no shortness of 

breath.


Timing/Duration:  1-2 days


Severity:  moderate


NTG SL PTA:  No


ASA po PTA:  No





Allergies and Home Medications


Allergies


Coded Allergies:  


     No Known Drug Allergies (Unverified , 12/9/14)





Home Medications


Metoprolol Succinate 25 Mg Tab.er.24h, 25 MG PO DAILY


   Prescribed by: LUIS FERNANDO DOLL on 11/7/17 0300





Patient Home Medication List


Home Medication List Reviewed:  Yes





Review of Systems


Constitutional:  see HPI


EENTM:  No Symptoms Reported


Respiratory:  No Symptoms Reported


Gastrointestinal:  No Symptoms Reported


Genitourinary:  No Symptoms Reported


Musculoskeletal:  no symptoms reported


Skin:  no symptoms reported


Psychiatric/Neurological:  No Symptoms Reported


Endocrine:  No Symptoms Reported


Hematologic/Lymphatic:  No Symptoms Reported





Past Medical-Social-Family Hx


Patient Social History


Alcohol Use:  Rarely Uses


Number of Drinks Today:  AA


Alcohol Beverage of Choice:  Beer


Recreational Drug Use:  Yes (THC)


Smoking Status:  Former Smoker


Type Used:  Smokeless Tobacco


Former Smoker, Quit:  Feb 23, 2008


2nd Hand Smoke Exposure:  No


Recent Foreign Travel:  No


Contact w/Someone Who Travel:  No


Recent Infectious Disease Expo:  No


Recent Hopitalizations:  No





Immunizations Up To Date


Tetanus Booster (TDap):  Unknown





Seasonal Allergies


Seasonal Allergies:  No





Past Medical History


Surgeries:  Yes (ATRIAL FIBRILLATION--S/P CARDIAC ABLATION(2015))


Appendectomy, Cardiac


Respiratory:  No


Cardiac:  Yes (S/P ABLATION)


Atrial Fibrillation, Hypertension


Neurological:  Yes


Concussion


Reproductive Disorders:  No


Sexually Transmitted Disease:  No


HIV/AIDS:  No


Genitourinary:  No


Gastrointestinal:  No


Musculoskeletal:  No


Endocrine:  No


HEENT:  No


Cancer:  No


Psychosocial:  No


Integumentary:  No


Blood Disorders:  No


Adverse Reaction/Blood Tranf:  No





Family Medical History





Colon cancer


  19 MOTHER


Hypertension


  G8 BROTHER


  G8 SISTER


Kidney disease


  G8 SISTER





Physical Exam


Vital Signs





Vital Signs - First Documented








 7/5/18





 16:23


 


Temp 98.0


 


Pulse 85


 


Resp 20


 


B/P (MAP) 155/114 (128)


 


Pulse Ox 97


 


O2 Delivery Room Air





Capillary Refill : Less Than 3 Seconds


General Appearance:  No Apparent Distress, WD/WN; No Anxious


HEENT:  PERRL/EOMI, TMs Normal


Neck:  Full Range of Motion, Normal Inspection


Respiratory:  Lungs Clear, Normal Breath Sounds, No Accessory Muscle Use, No 

Respiratory Distress


Cardiovascular:  Regular Rate, Rhythm, Normal Peripheral Pulses


Gastrointestinal:  Normal Bowel Sounds, Non Tender, Soft


Extremity:  Normal Capillary Refill, Normal Inspection


Neurologic/Psychiatric:  Alert, Oriented x3, No Motor/Sensory Deficits


Skin:  Normal Color, Warm/Dry





Progress/Results/Core Measures


Results/Orders


Lab Results





Laboratory Tests








Test


 7/5/18


16:34 7/5/18


16:56 Range/Units


 


 


White Blood Count


 6.0 


 


 4.3-11.0


10^3/uL


 


Red Blood Count


 4.93 


 


 4.35-5.85


10^6/uL


 


Hemoglobin 14.7   13.3-17.7  G/DL


 


Hematocrit 43   40-54  %


 


Mean Corpuscular Volume 87   80-99  FL


 


Mean Corpuscular Hemoglobin 30   25-34  PG


 


Mean Corpuscular Hemoglobin


Concent 34 


 


 32-36  G/DL





 


Red Cell Distribution Width 12.5   10.0-14.5  %


 


Platelet Count


 155 


 


 130-400


10^3/uL


 


Mean Platelet Volume 11.7 H  7.4-10.4  FL


 


Neutrophils (%) (Auto) 69   42-75  %


 


Lymphocytes (%) (Auto) 24   12-44  %


 


Monocytes (%) (Auto) 5   0-12  %


 


Eosinophils (%) (Auto) 2   0-10  %


 


Basophils (%) (Auto) 0   0-10  %


 


Neutrophils # (Auto) 4.1   1.8-7.8  X 10^3


 


Lymphocytes # (Auto) 1.4   1.0-4.0  X 10^3


 


Monocytes # (Auto) 0.3   0.0-1.0  X 10^3


 


Eosinophils # (Auto)


 0.1 


 


 0.0-0.3


10^3/uL


 


Basophils # (Auto)


 0.0 


 


 0.0-0.1


10^3/uL


 


Sodium Level 142   135-145  MMOL/L


 


Potassium Level 3.8   3.6-5.0  MMOL/L


 


Chloride Level 107     MMOL/L


 


Carbon Dioxide Level 29   21-32  MMOL/L


 


Anion Gap 6   5-14  MMOL/L


 


Blood Urea Nitrogen 13   7-18  MG/DL


 


Creatinine


 1.12 


 


 0.60-1.30


MG/DL


 


Estimat Glomerular Filtration


Rate > 60 


 


  





 


BUN/Creatinine Ratio 12    


 


Glucose Level 138 H    MG/DL


 


Calcium Level 9.5   8.5-10.1  MG/DL


 


Magnesium Level 2.1   1.8-2.4  MG/DL


 


Total Bilirubin 0.5   0.1-1.0  MG/DL


 


Aspartate Amino Transf


(AST/SGOT) 23 


 


 5-34  U/L





 


Alanine Aminotransferase


(ALT/SGPT) 33 


 


 0-55  U/L





 


Alkaline Phosphatase 52     U/L


 


Myoglobin


 43.9 


 


 10.0-92.0


NG/ML


 


Troponin I < 0.30   <0.30  NG/ML


 


Total Protein 7.3   6.4-8.2  GM/DL


 


Albumin 4.4   3.2-4.5  GM/DL


 


Prothrombin Time  14.6  12.2-14.7  SEC


 


INR Comment  1.1  0.8-1.4  


 


Activated Partial


Thromboplast Time 


 31 


 24-35  SEC











My Orders





Orders - ALISSA GOLDSMITH APRN


Cbc With Automated Diff (7/5/18 16:33)


Magnesium (7/5/18 16:33)


Chest 1 View, Ap/Pa Only (7/5/18 16:33)


Ekg Tracing (7/5/18 16:33)


Cardiac Profile 1 (7/5/18 16:33)


Comprehensive Metabolic Panel (7/5/18 16:33)


Myoglobin Serum (7/5/18 16:33)


Protime With Inr (7/5/18 16:33)


Partial Thromboplastin Time (7/5/18 16:33)


O2 (7/5/18 16:33)


Monitor-Rhythm Ecg Trace Only (7/5/18 16:33)


Lipid Panel (7/6/18 06:00)


Aspirin Chewable Tablet (Baby Aspirin Ch (7/5/18 16:45)


Saline Lock/Iv-Start (7/5/18 16:33)


Ns Iv 1000 Ml (Sodium Chloride 0.9%) (7/5/18 16:45)





Medications Given in ED





Current Medications








 Medications  Dose


 Ordered  Sig/Soco


 Route  Start Time


 Stop Time Status Last Admin


Dose Admin


 


 Aspirin  324 mg  ONCE  ONCE


 PO  7/5/18 16:45


 7/5/18 16:46 DC 7/5/18 16:58


324 MG








Vital Signs/I&O











 7/5/18 7/5/18





 16:23 16:58


 


Temp 98.0 98.0


 


Pulse 85 


 


Resp 20 


 


B/P (MAP) 155/114 (128) 


 


Pulse Ox 97 


 


O2 Delivery Room Air 














Blood Pressure Mean:  128











Departure


Communication (Admissions)


[id number]- I spoke with Dr. Amin. He would recommend restarting Eliquis, he'll see 

the patient in the office. Discussed this with the patient. He states he is not 

going to restart Eliquis I did advise of the possibility of stroke development 

with untreated A. fib however thisepisode of tachycardia may simply have been 

sinus in nature as well. Patient states he will follow up with elective 

physiologist Dr. Rocha in [place]





Impression





 Primary Impression:  


 Intermittent Tachycardia


Disposition:  01 HOME, SELF-CARE


Condition:  Stable





Departure-Patient Inst.


Decision time for Depature:  17:15


Referrals:  


SHANELL HAIRSTON MD (PCP/Family)


Primary Care Physician


Patient Instructions:  Palpitations (DC)





Add. Discharge Instructions:  


All discharge instructions reviewed with patient and/or family. Voiced 

understanding.











ALISSA GOLDSMITH Jul 5, 2018 17:15

## 2018-07-05 NOTE — DIAGNOSTIC IMAGING REPORT
Indication: Tachycardia, history of atrial fibrillation.  



Frontal chest obtained at 443 hours p.m. and compared with

11/11/2017.



Heart and mediastinal silhouette are normal in appearance.  The

lungs are clear. There is no pneumothorax or pleural fluid.



Impression: 

Negative chest.



Dictated by: 



  Dictated on workstation # [id number]